# Patient Record
Sex: MALE | Race: WHITE | NOT HISPANIC OR LATINO | Employment: FULL TIME | URBAN - METROPOLITAN AREA
[De-identification: names, ages, dates, MRNs, and addresses within clinical notes are randomized per-mention and may not be internally consistent; named-entity substitution may affect disease eponyms.]

---
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
## 2019-11-18 ENCOUNTER — OFFICE VISIT (OUTPATIENT)
Dept: URGENT CARE | Facility: MEDICAL CENTER | Age: 19
End: 2019-11-18
Payer: COMMERCIAL

## 2019-11-18 VITALS
TEMPERATURE: 98.7 F | SYSTOLIC BLOOD PRESSURE: 110 MMHG | BODY MASS INDEX: 28.2 KG/M2 | DIASTOLIC BLOOD PRESSURE: 68 MMHG | HEART RATE: 58 BPM | WEIGHT: 197 LBS | RESPIRATION RATE: 16 BRPM | OXYGEN SATURATION: 100 % | HEIGHT: 70 IN

## 2019-11-18 DIAGNOSIS — K52.9 GASTROENTERITIS: ICD-10-CM

## 2019-11-18 DIAGNOSIS — K29.00 ACUTE GASTRITIS WITHOUT HEMORRHAGE, UNSPECIFIED GASTRITIS TYPE: ICD-10-CM

## 2019-11-18 DIAGNOSIS — R11.2 NAUSEA AND VOMITING, INTRACTABILITY OF VOMITING NOT SPECIFIED, UNSPECIFIED VOMITING TYPE: ICD-10-CM

## 2019-11-18 PROCEDURE — 99214 OFFICE O/P EST MOD 30 MIN: CPT | Performed by: NURSE PRACTITIONER

## 2019-11-18 RX ORDER — ONDANSETRON 4 MG/1
4 TABLET, ORALLY DISINTEGRATING ORAL EVERY 8 HOURS PRN
Qty: 20 TAB | Refills: 0 | Status: SHIPPED | OUTPATIENT
Start: 2019-11-18

## 2019-11-18 RX ORDER — OMEPRAZOLE 20 MG/1
20 CAPSULE, DELAYED RELEASE ORAL 2 TIMES DAILY
Qty: 60 CAP | Refills: 0 | Status: SHIPPED | OUTPATIENT
Start: 2019-11-18

## 2019-11-18 RX ORDER — ONDANSETRON 4 MG/1
4 TABLET, ORALLY DISINTEGRATING ORAL ONCE
Status: COMPLETED | OUTPATIENT
Start: 2019-11-18 | End: 2019-11-18

## 2019-11-18 RX ADMIN — ONDANSETRON 4 MG: 4 TABLET, ORALLY DISINTEGRATING ORAL at 12:15

## 2019-11-18 ASSESSMENT — ENCOUNTER SYMPTOMS
DIZZINESS: 0
HEADACHES: 0
CHILLS: 1
FATIGUE: 0
NAUSEA: 1
CONSTIPATION: 0
EYE REDNESS: 0
DIARRHEA: 0
WEIGHT LOSS: 0
ARTHRALGIAS: 0
BLOATING: 1
HEARTBURN: 1
BACK PAIN: 0
FLATUS: 0
NECK PAIN: 0
VOMITING: 1
SWEATS: 0
TINGLING: 0
COUGH: 0
MYALGIAS: 0
SHORTNESS OF BREATH: 0
FOCAL WEAKNESS: 0
ANOREXIA: 0
EYE DISCHARGE: 0
FEVER: 0
ABDOMINAL PAIN: 0
NUMBER OF EPISODES OF EMESIS TODAY: 1
BLOOD IN STOOL: 0

## 2019-11-18 ASSESSMENT — LIFESTYLE VARIABLES: SUBSTANCE_ABUSE: 0

## 2019-11-18 NOTE — PROGRESS NOTES
Subjective:      Hardy Rizvi Jr. is a 19 y.o. male who presents with Emesis (x1week usually in the AM, vomiting mucus and stomach acid, post nasal drip)    Reviewed past medical, surgical and family history. Reviewed prescription and OTC medications with patient in electronic health record today      No Known Allergies          Emesis   This is a new problem. The current episode started in the past 7 days. The problem has been waxing and waning. Associated symptoms include chills, congestion (with PND), nausea (Occasionally occurs all day long) and vomiting. Pertinent negatives include no abdominal pain, anorexia, arthralgias, chest pain, coughing, fatigue, fever, headaches, myalgias or neck pain. Associated symptoms comments: Occurs in the morning and occasionally at night. Stomach acid and mucous. No changed by eating. . Nothing aggravates the symptoms. He has tried rest (pepcid ( helped a little), Benadryl) for the symptoms. The treatment provided mild relief.   Diarrhea    This is a new problem. Episode onset:  2days. The problem occurs 5 to 10 times per day. The problem has been unchanged. The stool consistency is described as watery. The patient states that diarrhea does not awaken him from sleep. Associated symptoms include bloating, chills and vomiting. Pertinent negatives include no abdominal pain, arthralgias, coughing, fever, headaches, increased  flatus, myalgias, sweats, URI or weight loss. Nothing aggravates the symptoms. There are no known risk factors. He has tried nothing for the symptoms.   eats greasy, spicy foods. No ETOH or tobacco use. Drinks a lot of caffeinated beverages throughout the day although he has cut back in the last 2 to 3 days.  ( coffee, sodas, energy drinks).    Review of Systems   Constitutional: Positive for chills. Negative for fatigue, fever, malaise/fatigue and weight loss.   HENT: Positive for congestion (with PND).    Eyes: Negative for discharge and redness.  "  Respiratory: Negative for cough and shortness of breath.    Cardiovascular: Negative for chest pain.   Gastrointestinal: Positive for bloating, heartburn (intermittently for 'a while' now), nausea (Occasionally occurs all day long) and vomiting. Negative for abdominal pain, anorexia, blood in stool, constipation, diarrhea and flatus.   Genitourinary: Negative for dysuria and urgency.   Musculoskeletal: Negative for arthralgias, back pain, myalgias and neck pain.   Neurological: Negative for dizziness, tingling, focal weakness and headaches.   Endo/Heme/Allergies: Negative for environmental allergies.   Psychiatric/Behavioral: Negative for substance abuse.          Objective:     /68   Pulse (!) 58   Temp 37.1 °C (98.7 °F) (Temporal)   Resp 16   Ht 1.778 m (5' 10\")   Wt 89.4 kg (197 lb)   SpO2 100%   BMI 28.27 kg/m²      Physical Exam  Vitals signs and nursing note reviewed.   Constitutional:       General: He is not in acute distress.     Appearance: Normal appearance. He is well-developed. He is not toxic-appearing or diaphoretic.   HENT:      Head: Normocephalic.   Cardiovascular:      Rate and Rhythm: Normal rate and regular rhythm.   Pulmonary:      Effort: Pulmonary effort is normal.      Breath sounds: Normal breath sounds.   Abdominal:      Palpations: Abdomen is soft.   Musculoskeletal: Normal range of motion.   Neurological:      Mental Status: He is alert and oriented to person, place, and time.      Deep Tendon Reflexes: Reflexes are normal and symmetric.   Psychiatric:         Behavior: Behavior is cooperative.                 Assessment/Plan:       1. Nausea and vomiting, intractability of vomiting not specified, unspecified vomiting type  ondansetron (ZOFRAN ODT) 4 MG TABLET DISPERSIBLE    ondansetron (ZOFRAN ODT) dispertab 4 mg   2. Acute gastritis without hemorrhage, unspecified gastritis type  omeprazole (PRILOSEC) 20 MG delayed-release capsule   3. Gastroenteritis         Educated in " proper administration of medication(s) ordered today including safety, possible SE, risks, benefits, rationale and alternatives to therapy.     Keep well hydrated    BRAT type diet until symptoms resolve    Return to clinic or PCP   10-14 days if current symptoms are not resolving in a satisfactory manner or sooner if new or worsening symptoms occur. Differential diagnosis, natural history, supportive care, and indications for immediate follow-up discussed at length.   Patient was advised of signs and symptoms which would warrant further evaluation and /or emergent evaluation in ER.  Verbalized agreement with this treatment plan and seemed to understand without barriers. Questions were encouraged and answered to patients satisfaction.

## 2019-11-18 NOTE — LETTER
November 18, 2019       Patient: Hardy Rizvi .   YOB: 2000   Date of Visit: 11/18/2019         To Whom It May Concern:    It is my medical opinion that Hardy Rizvi return to full duty, no restriction on 11/19/19 due to a medical problem.        Sincerely,          DAVE Goldstein  Electronically Signed

## 2020-12-23 ENCOUNTER — APPOINTMENT (OUTPATIENT)
Dept: RADIOLOGY | Facility: MEDICAL CENTER | Age: 20
End: 2020-12-23
Attending: FAMILY MEDICINE
Payer: COMMERCIAL

## 2020-12-23 ENCOUNTER — OCCUPATIONAL MEDICINE (OUTPATIENT)
Dept: URGENT CARE | Facility: PHYSICIAN GROUP | Age: 20
End: 2020-12-23
Payer: COMMERCIAL

## 2020-12-23 VITALS
RESPIRATION RATE: 16 BRPM | HEART RATE: 55 BPM | OXYGEN SATURATION: 99 % | DIASTOLIC BLOOD PRESSURE: 72 MMHG | HEIGHT: 70 IN | WEIGHT: 200 LBS | BODY MASS INDEX: 28.63 KG/M2 | TEMPERATURE: 97.7 F | SYSTOLIC BLOOD PRESSURE: 122 MMHG

## 2020-12-23 DIAGNOSIS — Z02.1 PRE-EMPLOYMENT DRUG SCREENING: ICD-10-CM
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
## 2020-12-23 DIAGNOSIS — S09.90XA CLOSED HEAD INJURY, INITIAL ENCOUNTER: ICD-10-CM

## 2020-12-23 LAB
AMP AMPHETAMINE: NORMAL
BAR BARBITURATES: NORMAL
BZO BENZODIAZEPINES: NORMAL
COC COCAINE: NORMAL
INT CON NEG: NORMAL
INT CON POS: NORMAL
MDMA ECSTASY: NORMAL
MET METHAMPHETAMINES: NORMAL
MTD METHADONE: NORMAL
OPI OPIATES: NORMAL
OXY OXYCODONE: NORMAL
PCP PHENCYCLIDINE: NORMAL
POC URINE DRUG SCREEN OCDRS: NORMAL
THC: NORMAL

## 2020-12-23 PROCEDURE — 80305 DRUG TEST PRSMV DIR OPT OBS: CPT | Performed by: FAMILY MEDICINE

## 2020-12-23 PROCEDURE — 99214 OFFICE O/P EST MOD 30 MIN: CPT | Performed by: FAMILY MEDICINE

## 2020-12-23 ASSESSMENT — ENCOUNTER SYMPTOMS: HEADACHES: 1

## 2020-12-23 NOTE — LETTER
"EMPLOYEE’S CLAIM FOR COMPENSATION/ REPORT OF INITIAL TREATMENT  FORM C-4    EMPLOYEE’S CLAIM - PROVIDE ALL INFORMATION REQUESTED   First Name  Hardy Last Name  Belkys Birthdate                    2000                Sex  male Claim Number   Home Address  [address] Age  20 y.o. Height  1.778 m (5' 10\") Weight  90.7 kg (200 lb) [facility]     [facility] Zip  [address] Telephone  [phone] (home)    Mailing Address  [address] [facility] Zip  [address] Primary Language Spoken  English    Insurer  [employer] Third Party   [employer]   Employee's Occupation (Job Title) When Injury or Occupational Disease Occurred      Employer's Name  [employer]  Telephone      Employer Address  [address]  [facility]  Zip  [address]    Date of Injury  12/23/2020               Hour of Injury  7:00 AM Date Employer Notified  12/23/2020 Last Day of Work after Injury     or Occupational Disease  12/23/2020 Supervisor to Whom Injury     Reported  Babar Tran   Address or Location of Accident (if applicable)  [[employer]]   What were you doing at the time of accident? (if applicable)  Moving a bumper & tripped    How did this injury or occupational disease occur? (Be specific an answer in detail. Use additional sheet if necessary)  Moving a bumper to fix it, picked it up and took a step back making me trip and hit my heaqd on a metal rack.    If you believe that you have an occupational disease, when did you first have knowledge of the disability and it relationship to your employment?  N/A Witnesses to the Accident  Babar Tran      Nature of Injury or Occupational Disease  Workers' Compensation  Part(s) of Body Injured or Affected  Skull, N/A, N/A    I certify that the above is true and correct to the best of my " knowledge and that I have provided this information in order to obtain the benefits of [place]  I hereby authorize any physician, chiropractor, surgeon, practitioner, or other person, any hospital, including [facility], any medical service organization, any insurance company, or other institution or organization to release to each other, any medical or other information, including benefits paid or payable, pertinent to this injury or disease, except information relative to diagnosis, treatment and/or counseling for AIDS, psychological conditions, alcohol or controlled substances, for which I must give specific authorization.  A Photostat of this authorization shall be as valid as the original.     Date   Place   Employee’s Signature   THIS REPORT MUST BE COMPLETED AND MAILED WITHIN 3 WORKING DAYS OF TREATMENT   Place  [facility]  Name of Facility  [facility]   Date  12/23/2020 Diagnosis  (S09.90XA) Closed head injury, initial encounter  (Y99.0) Accident at workplace  (Z02.1) Pre-employment drug screening Is there evidence the injured employee was under the              influence of alcohol and/or another controlled substance at the time of accident?   Hour  1:38 PM Description of Injury or Disease  Diagnoses of Closed head injury, initial encounter, Accident at workplace, and Pre-employment drug screening were pertinent to this visit. No   Treatment  CT head, off work x 1-2 days  Have you advised the patient to remain off work five days or     more? No   X-Ray Findings      If Yes   From Date  To Date      From information given by the employee, together with medical evidence, can you directly connect this injury or occupational disease as job incurred?  Yes If No Full Duty    No Modified Duty  No   Is additional medical care by a physician indicated?  Yes If Modified  "Duty, Specify any Limitations / Restrictions      Do you know of any previous injury or disease contributing to this condition or occupational disease?                            No   Date  12/23/2020 Print Doctor’s Name   Andres Contreras M.D. I certify the employer’s copy of  this form was mailed on:   Address  [address] Insurer’s Use Only     [facility] [place] [facility] Zip  [address]    Provider’s Tax ID Number  [id number] Telephone  Dept: [phone]      e-ANDRES Dahl M.D.  Signature:     Degree          ORIGINAL-TREATING PHYSICIAN OR CHIROPRACTOR    PAGE 2-INSURER/TPA    PAGE 3-EMPLOYER    PAGE 4-EMPLOYEE        Form C-4 (rev.10/07)           BRIEF DESCRIPTION OF RIGHTS AND BENEFITS  (Pursuant to [place] 616C.050)    Notice of Injury or Occupational Disease (Incident Report Form C-1): If an injury or occupational disease (OD) arises out of and in the course of employment, you must provide written notice to your employer as soon as practicable, but no later than 7 days after the accident or OD. Your employer shall maintain a sufficient supply of the required forms.    Claim for Compensation (Form C-4): If medical treatment is sought, the form C-4 is available at the place of initial treatment. A completed \"Claim for Compensation\" (Form C-4) must be filed within 90 days after an accident or OD. The treating physician or chiropractor must, within 3 working days after treatment, complete and mail to the employer, the employer's insurer and third-party , the Claim for Compensation.    Medical Treatment: If you require medical treatment for your on-the-job injury or OD, you may be required to select a physician or chiropractor from a list provided by your workers’ compensation insurer, if it has contracted with an Organization for Managed Care (MCO) or Preferred Provider Organization (PPO) or providers of health care. If your employer has not entered into a contract with an MCO or PPO, you may " select a physician or chiropractor from the Panel of Physicians and Chiropractors. Any medical costs related to your industrial injury or OD will be paid by your insurer.    Temporary Total Disability (TTD): If your doctor has certified that you are unable to work for a period of at least 5 consecutive days, or 5 cumulative days in a 20-day period, or places restrictions on you that your employer does not accommodate, you may be entitled to TTD compensation.    Temporary Partial Disability (TPD): If the wage you receive upon reemployment is less than the compensation for TTD to which you are entitled, the insurer may be required to pay you TPD compensation to make up the difference. TPD can only be paid for a maximum of 24 months.    Permanent Partial Disability (PPD): When your medical condition is stable and there is an indication of a PPD as a result of your injury or OD, within 30 days, your insurer must arrange for an evaluation by a rating physician or chiropractor to determine the degree of your PPD. The amount of your PPD award depends on the date of injury, the results of the PPD evaluation, your age and wage.    Permanent Total Disability (PTD): If you are medically certified by a treating physician or chiropractor as permanently and totally disabled and have been granted a PTD status by your insurer, you are entitled to receive monthly benefits not to exceed 66 2/3% of your average monthly wage. The amount of your PTD payments is subject to reduction if you previously received a lump-sum PPD award.    Vocational Rehabilitation Services: You may be eligible for vocational rehabilitation services if you are unable to return to the job due to a permanent physical impairment or permanent restrictions as a result of your injury or occupational disease.    Transportation and Per Rosa Maria Reimbursement: You may be eligible for travel expenses and per rosa maria associated with medical treatment.    Reopening: You may be  able to reopen your claim if your condition worsens after claim closure.     Appeal Process: If you disagree with a written determination issued by the insurer or the insurer does not respond to your request, you may appeal to the Department of Administration, , by following the instructions contained in your determination letter. You must appeal the determination within 70 days from the date of the determination letter at [address], [address], [place], [place] [address], or [address], [place], [place] [address]. If you disagree with the  decision, you may appeal to the Department of Administration, . You must file your appeal within 30 days from the date of the  decision letter at [address], [address], [place], [place] [address], or [address], [place], [place] [address]. If you disagree with a decision of an , you may file a petition for judicial review with the District Court. You must do so within 30 days of the Appeal Officer’s decision. You may be represented by an  at your own expense or you may contact the [facility] for possible representation.    [place]  for Injured Workers (NAIW): If you disagree with a  decision, you may request that NAIW represent you without charge at an  Hearing. For information regarding denial of benefits, you may contact the [facility] at: [address], [address], [place] [address], [phone], or [address], [place] [address], [phone]    To File a Complaint with the Division: If you wish to file a complaint with the  of the Division of Industrial Relations (DIR),  please contact the Workers’ Compensation Section, [address], [facility] [address], [place], [place] [address], telephone ([phone]) [phone], or [address], [place], [place]  [address], telephone [phone].    For assistance with Workers’ Compensation Issues: You may contact the [facility] Office for Consumer Health Assistance, [facility] [facility], [facility] [address], [place] [id number], Toll Free 1-417.327.2090, Web site: http://[facility].[place].gov/Programs/SILVINO E-mail: silvino@[facility].[place].gov              __________________________________________________________________                                    _________________            Employee Name / Signature                                                                                                                            Date                                                                                                                                                                                                                              D-2 (rev. 10/20)

## 2020-12-23 NOTE — LETTER
[facility] [place] [address]  Phone:  [phone] - Fax:  [phone]   [facility] Progress Report and Disability Certification  Date of Service: 12/23/2020   No Show:  No  Date / Time of Next Visit: 12/24/2020   Claim Information   Patient Name: Hardy Rizvi Jr.  Claim Number:     Employer: [employer]  Date of Injury: 12/23/2020     Insurer / TPA: Isaac Conklin  ID / SSN:     Occupation:   Diagnosis: Diagnoses of Closed head injury, initial encounter, Accident at workplace, and Pre-employment drug screening were pertinent to this visit.    Medical Information   Related to Industrial Injury? Yes    Subjective Complaints:  DOI 12/23/2020 PALMIRA: moving a bumper and stepped back and tripped and fll backwards and hit back of head. No LOC but foggy headed and bad diffuse 8/10 headache all day. No focal limb weakness   Objective Findings:     Pre-Existing Condition(s):     Assessment:   Initial Visit    Status: Additional Care Required  Permanent Disability:No    Plan:      Diagnostics: CT    Comments:       Disability Information   Status: Temporarily Totally Disabled    From:  12/23/2020  Through: 12/24/2020 Restrictions are: Temporary   Physical Restrictions   Sitting:    Standing:    Stooping:    Bending:      Squatting:    Walking:    Climbing:    Pushing:      Pulling:    Other:    Reaching Above Shoulder (L):   Reaching Above Shoulder (R):       Reaching Below Shoulder (L):    Reaching Below Shoulder (R):      Not to exceed Weight Limits   Carrying(hrs):   Weight Limit(lb):   Lifting(hrs):   Weight  Limit(lb):     Comments: ** off work for today and tomorrow **    Repetitive Actions   Hands: i.e. Fine Manipulations from Grasping:     Feet: i.e. Operating Foot Controls:     Driving / Operate Machinery:     Provider Name:   Andres Contreras M.D. Physician Signature:  Physician Name:     Clinic [facility] / Location[facility]  [facility] [address] [place] [address] Clinic Phone Number: Dept: [phone]   Appointment Time: 12:20 Pm Visit Start Time: 1:38 PM   Check-In Time:  12:36 Pm Visit Discharge Time:  2:35 PM    Original-Treating Physician or Chiropractor    Page 2-Insurer/TPA    Page 3-Employer    Page 4-Employee

## 2020-12-23 NOTE — PATIENT INSTRUCTIONS
Head Injury, Adult  There are many types of head injuries. Head injuries can be as minor as a bump, or they can be a serious medical issue. More severe head injuries include:  · A jarring injury to the brain (concussion).  · A bruise (contusion) of the brain. This means there is bleeding in the brain that can cause swelling.  · A cracked skull (skull fracture).  · Bleeding in the brain that collects, clots, and forms a bump (hematoma).  After a head injury, most problems occur within the first 24 hours, but side effects may occur up to 7-10 days after the injury. It is important to watch your condition for any changes. You may need to be observed in the emergency department or urgent care, or you may be admitted to the hospital.  What are the causes?  There are many possible causes of a head injury. A serious head injury may be caused by a car accident, bicycle or motorcycle accidents, sports injuries, and falls.  What are the symptoms?  Symptoms of a head injury include a contusion, bump, or bleeding at the site of the injury. Other physical symptoms may include:  · Headache.  · Nausea or vomiting.  · Dizziness.  · Feeling tired.  · Being uncomfortable around bright lights or loud noises.  · Seizures.  · Trouble being awakened.  · Fainting.  Mental or emotional symptoms may include:  · Irritability.  · Confusion and memory problems.  · Poor attention and concentration.  · Changes in eating or sleeping habits.  · Anxiety or depression.  How is this diagnosed?  This condition can usually be diagnosed based on your symptoms, a description of the injury, and a physical exam. You may also have imaging tests done, such as a CT scan or MRI.  How is this treated?  Treatment for this condition depends on the severity and type of injury you have. The main goal of treatment is to prevent complications and to allow the brain time to heal.  Mild head injury  If you have a mild head injury, you may be sent home and treatment may  include:  · Observation. A responsible adult should stay with you for 24 hours after your injury and check on you often.  · Physical rest.  · Brain rest.  · Pain medicines.  Severe head injury  If you have a severe head injury, treatment may include:  · Close observation. This includes hospitalization with frequent physical exams.  · Medicines to relieve pain, prevent seizures, and decrease brain swelling.  · Breathing support. This may include using a ventilator.  · Treatments to manage the swelling inside the brain.  · Brain surgery. This may be needed to:  ? Remove a blood clot.  ? Stop the bleeding.  ? Remove a part of the skull to allow room for the brain to swell.  Follow these instructions at home:  Activity  · Rest and avoid activities that are physically hard or tiring.  · Make sure you get enough sleep.  · Limit activities that require a lot of thought or attention, such as:  ? Watching TV.  ? Playing memory games and puzzles.  ? Job-related work or homework.  ? Working on the computer, using social media, and texting.  · Avoid activities that could cause another head injury, such as playing sports, until your health care provider approves. Having another head injury, especially before the first one has healed, can be dangerous.  · Ask your health care provider when it is safe for you to return to your regular activities, including work or school. Ask your health care provider for a step-by-step plan for gradually returning to activities.  · Ask your health care provider when you can drive, ride a bicycle, or use heavy machinery. Your ability to react may be slower after a brain injury. Do not do these activities if you are dizzy.  Lifestyle    · Do not drink alcohol until your health care provider approves. Do not use drugs. Alcohol and certain drugs may slow your recovery and can put you at risk of further injury.  · If it is harder than usual to remember things, write them down.  · If you are easily  distracted, try to do one thing at a time.  · Talk with family members or close friends when making important decisions.  · Tell your friends, family, a trusted colleague, and  about your injury, symptoms, and restrictions. Have them watch for any new or worsening problems.  General instructions  · Take over-the-counter and prescription medicines only as told by your health care provider.  · Have someone stay with you for 24 hours after your head injury. This person should watch you for any changes in your symptoms and be ready to seek medical help.  · Keep all follow-up visits as told by your health care provider. This is important.  How is this prevented?  · Work on improving your balance and strength to avoid falls.  · Wear a seatbelt when you are in a moving vehicle.  · Wear a helmet when riding a bicycle, skiing, or doing any other sport or activity that has a risk of injury.  · If you drink alcohol:  ? Limit how much you use to:  ? 0-1 drink a day for women.  ? 0-2 drinks a day for men.  ? Be aware of how much alcohol is in your drink. In the [place] one drink equals one 12 oz bottle of beer (355 mL), one 5 oz glass of wine (148 mL), or one 1½ oz glass of hard liquor (44 mL).  · Take safety measures in your home, such as:  ? Removing clutter and tripping hazards from floors and stairways.  ? Using grab bars in bathrooms and handrails by stairs.  ? Placing non-slip mats on floors and in bathtubs.  ? Improving lighting in dim areas.  Get help right away if:  · You have:  ? A severe headache that is not helped by medicine.  ? Trouble walking or weakness in your arms and legs.  ? Clear or bloody fluid coming from your nose or ears.  ? Changes in your vision.  ? A seizure.  · You lose your balance.  · You vomit.  · Your pupils change size.  · Your speech is slurred.  · Your dizziness gets worse.  · You faint.  · You are sleepier than normal and have trouble staying awake.  · Your symptoms get  worse.  These symptoms may represent a serious problem that is an emergency. Do not wait to see if the symptoms will go away. Get medical help right away. Call your local emergency services (911 in the [place] Do not drive yourself to the hospital.  Summary  · Head injuries can be minor or they can be a serious medical issue requiring immediate attention.  · Treatment for this condition depends on the severity and type of injury you have.  · Ask your health care provider when it is safe for you to return to your regular activities, including work or school.  · Head injury prevention includes wearing a seat belt in a motor vehicle, using a helmet on a bicycle, limiting alcohol use, and taking safety measures in your home.  This information is not intended to replace advice given to you by your health care provider. Make sure you discuss any questions you have with your health care provider.  Document Released: 12/18/2006 Document Revised: 01/15/2020 Document Reviewed: 01/10/2020  [employer]vier Patient Education © 2020 [employer]

## 2020-12-23 NOTE — PROGRESS NOTES
"Subjective:      Hardy Rizvi Jr. is a 20 y.o. male who presents with Headache (x NEW WC, DOI 12/23/2020 7am, pt states they tripped at work and hit back of head on metal rack, now has headache ) and Drug / Alcohol Assessment (Drug test post accident 12/23/2020)      DOI 12/23/2020 [place]: moving a bumper and stepped back and tripped and fll backwards and hit back of head. No LOC but foggy headed and bad diffuse 8/10 headache all day. No focal limb weakness     HPI    Review of Systems   Neurological: Positive for headaches.   All other systems reviewed and are negative.         Objective:     /72   Pulse (!) 55   Temp 36.5 °C (97.7 °F) (Temporal)   Resp 16   Ht 1.778 m (5' 10\")   Wt 90.7 kg (200 lb)   SpO2 99%   BMI 28.70 kg/m²      Physical Exam  Vitals signs and nursing note reviewed.   Constitutional:       General: He is not in acute distress.     Appearance: He is well-developed. He is not diaphoretic.   HENT:      Head: Normocephalic and atraumatic.   Eyes:      General: No scleral icterus.     Extraocular Movements: Extraocular movements intact.      Conjunctiva/sclera: Conjunctivae normal.      Pupils: Pupils are equal, round, and reactive to light.   Neck:      Musculoskeletal: Neck supple.   Cardiovascular:      Heart sounds: Normal heart sounds. No murmur.   Pulmonary:      Effort: Pulmonary effort is normal. No respiratory distress.   Neurological:      General: No focal deficit present.      Mental Status: He is alert.      Cranial Nerves: No cranial nerve deficit.      Motor: No weakness or abnormal muscle tone.      Coordination: Coordination normal.      Gait: Gait normal.   Psychiatric:         Mood and Affect: Mood normal.         Behavior: Behavior normal.         Judgment: Judgment normal.                 Assessment/Plan:            1. Closed head injury, initial encounter  CT-HEAD W/O   2. Accident at workplace  POCT 11 Panel Urine Drug Screen   3. Pre-employment drug " screening           - off work for today and tomorrow   - 24 hr recheck, ER precautions discussed

## 2020-12-24 ENCOUNTER — HOSPITAL ENCOUNTER (OUTPATIENT)
Dept: RADIOLOGY | Facility: MEDICAL CENTER | Age: 20
End: 2020-12-24
Attending: FAMILY MEDICINE
Payer: COMMERCIAL

## 2020-12-24 DIAGNOSIS — S09.90XA CLOSED HEAD INJURY, INITIAL ENCOUNTER: ICD-10-CM

## 2020-12-24 PROCEDURE — 70450 CT HEAD/BRAIN W/O DYE: CPT

## 2020-12-25 ENCOUNTER — TELEPHONE (OUTPATIENT)
Dept: URGENT CARE | Facility: CLINIC | Age: 20
End: 2020-12-25

## 2020-12-25 NOTE — TELEPHONE ENCOUNTER
Kindly call (DOCUMENT CALL OR ATTEMPTED CALL IN EPIC) and let patient know:         Head CT was normal. Follow up as planned in clinic

## 2024-04-29 ENCOUNTER — HOSPITAL ENCOUNTER (OUTPATIENT)
Facility: MEDICAL CENTER | Age: 24
End: 2024-04-29
Payer: COMMERCIAL

## 2024-04-29 ENCOUNTER — OFFICE VISIT (OUTPATIENT)
Dept: URGENT CARE | Facility: CLINIC | Age: 24
End: 2024-04-29
Payer: COMMERCIAL

## 2024-04-29 VITALS
HEIGHT: 70 IN | RESPIRATION RATE: 16 BRPM | TEMPERATURE: 97 F | SYSTOLIC BLOOD PRESSURE: 118 MMHG | WEIGHT: 239.5 LBS | BODY MASS INDEX: 34.29 KG/M2 | DIASTOLIC BLOOD PRESSURE: 66 MMHG | HEART RATE: 80 BPM | OXYGEN SATURATION: 97 %

## 2024-04-29 DIAGNOSIS — R50.9 FEVER, UNSPECIFIED FEVER CAUSE: ICD-10-CM

## 2024-04-29 DIAGNOSIS — J03.90 EXUDATIVE TONSILLITIS: ICD-10-CM

## 2024-04-29 LAB
FLUAV RNA SPEC QL NAA+PROBE: NEGATIVE
FLUBV RNA SPEC QL NAA+PROBE: NEGATIVE
RSV RNA SPEC QL NAA+PROBE: NEGATIVE
S PYO DNA SPEC NAA+PROBE: NOT DETECTED
SARS-COV-2 RNA RESP QL NAA+PROBE: NEGATIVE

## 2024-04-29 PROCEDURE — 87070 CULTURE OTHR SPECIMN AEROBIC: CPT

## 2024-04-29 RX ORDER — AMOXICILLIN 500 MG/1
500 CAPSULE ORAL 2 TIMES DAILY
Qty: 20 CAPSULE | Refills: 0 | Status: SHIPPED | OUTPATIENT
Start: 2024-04-29 | End: 2024-05-09

## 2024-04-29 NOTE — PROGRESS NOTES
"Subjective:   Hardy Rizvi Jr. is a 23 y.o. male who presents for Pharyngitis (Woke up today with a sore throat and seems to have white patches in the back of throat only symptom )      HPI:    Patient presents to urgent care with concerns of sore throat and body aches x 2 days.  States he has white spots on his tonsils  Endorses slight rhinorrhea, nasal congestion, cough. He has pmh of mild asthma, reports slightly increased activity.  Reports subjective fever, has not measured temperature at home.   No nausea, vomiting, tolerating fluids. States solids are difficult to pass.  Denies rash  Denies known sick contacts.    ROS As above in HPI    Medications:    Current Outpatient Medications on File Prior to Visit   Medication Sig Dispense Refill    omeprazole (PRILOSEC) 20 MG delayed-release capsule Take 1 Cap by mouth 2 times a day. Take 1 cap BID for 7 days then QD thereafter (Patient not taking: Reported on 12/23/2020) 60 Cap 0    ondansetron (ZOFRAN ODT) 4 MG TABLET DISPERSIBLE Take 1 Tab by mouth every 8 hours as needed. (Patient not taking: Reported on 12/23/2020) 20 Tab 0    albuterol (PROVENTIL) 2.5mg/3ml Nebu Soln solution for nebulization USE 1 VIAL VIA NEBULIZER QID  0     No current facility-administered medications on file prior to visit.        Allergies:   Food    Problem List:   There is no problem list on file for this patient.       Surgical History:  No past surgical history on file.    Past Social Hx:   Social History     Tobacco Use    Smoking status: Some Days     Types: Cigarettes    Smokeless tobacco: Never   Vaping Use    Vaping Use: Some days   Substance Use Topics    Alcohol use: No    Drug use: Yes     Types: Marijuana          Problem list, medications, and allergies reviewed by myself today in Epic.     Objective:     /66 (BP Location: Left arm, Patient Position: Sitting)   Pulse 80   Temp 36.1 °C (97 °F) (Temporal)   Resp 16   Ht 1.778 m (5' 10\")   Wt 109 kg (239 lb 8 " oz)   SpO2 97%   BMI 34.36 kg/m²     Physical Exam  Vitals and nursing note reviewed.   Constitutional:       General: He is not in acute distress.     Appearance: Normal appearance. He is not ill-appearing or diaphoretic.   HENT:      Head: Normocephalic.      Right Ear: Ear canal normal. Tympanic membrane is injected.      Left Ear: Ear canal normal. Tympanic membrane is injected.      Nose: Rhinorrhea present. No congestion. Rhinorrhea is clear.      Right Sinus: No maxillary sinus tenderness or frontal sinus tenderness.      Left Sinus: No maxillary sinus tenderness or frontal sinus tenderness.      Mouth/Throat:      Mouth: Mucous membranes are moist.      Pharynx: Uvula midline. Pharyngeal swelling and posterior oropharyngeal erythema present. No oropharyngeal exudate or uvula swelling.      Tonsils: Tonsillar exudate present. No tonsillar abscesses. 3+ on the right. 3+ on the left.   Cardiovascular:      Rate and Rhythm: Normal rate and regular rhythm.      Heart sounds: Normal heart sounds.   Pulmonary:      Effort: Pulmonary effort is normal. No respiratory distress.      Breath sounds: Normal air entry. No stridor. No decreased breath sounds, wheezing, rhonchi or rales.   Chest:      Chest wall: No tenderness.   Abdominal:      General: Bowel sounds are normal.      Palpations: Abdomen is soft.   Musculoskeletal:      Cervical back: No rigidity or tenderness.   Lymphadenopathy:      Cervical: Cervical adenopathy present.   Skin:     General: Skin is warm and dry.      Capillary Refill: Capillary refill takes less than 2 seconds.      Findings: No rash.   Neurological:      Mental Status: He is oriented to person, place, and time.         Assessment/Plan:       Results for orders placed or performed in visit on 04/29/24   POCT CoV-2, Flu A/B, RSV by PCR   Result Value Ref Range    SARS-CoV-2 by PCR Negative Negative, Invalid    Influenza virus A RNA Negative Negative, Invalid    Influenza virus B, PCR  Negative Negative, Invalid    RSV, PCR Negative Negative, Invalid   POCT CEPHEID GROUP A STREP - PCR   Result Value Ref Range    POC Group A Strep, PCR Not Detected Not Detected, Invalid       Diagnosis and associated orders:   1. Fever, unspecified fever cause  - POCT CoV-2, Flu A/B, RSV by PCR  - POCT CEPHEID GROUP A STREP - PCR    2. Exudative tonsillitis  - CULTURE THROAT; Future  - amoxicillin (AMOXIL) 500 MG Cap; Take 1 Capsule by mouth 2 times a day for 10 days.  Dispense: 20 Capsule; Refill: 0        Comments/MDM:       Negative strep. Throat culture ordered.  Hygiene measures reviewed to prevent coinfection  Supportive measures encouraged: Rest, increased oral hydration, NSAIDs/tylenol as needed per package instructions, lozenges, ice pop, diet as tolerated.  Follow up with PCP or return to UC should symptoms fail to improve.       Return to clinic or go to ED if symptoms worsen or persist. Indications for ED discussed at length. Patient/Parent/Guardian voices understanding. Follow-up with your primary care provider in 3-5 days. Red flag symptoms discussed. All side effects of medication discussed including allergic response, GI upset, tendon injury, rash, sedation etc.    Please note that this dictation was created using voice recognition software. I have made a reasonable attempt to correct obvious errors, but I expect that there are errors of grammar and possibly content that I did not discover before finalizing the note.    This note was electronically signed by JANAY Tellez

## 2024-05-02 LAB
BACTERIA SPEC RESP CULT: NORMAL
SIGNIFICANT IND 70042: NORMAL
SITE SITE: NORMAL
SOURCE SOURCE: NORMAL